# Patient Record
Sex: MALE | Race: WHITE | NOT HISPANIC OR LATINO | URBAN - METROPOLITAN AREA
[De-identification: names, ages, dates, MRNs, and addresses within clinical notes are randomized per-mention and may not be internally consistent; named-entity substitution may affect disease eponyms.]

---

## 2020-07-25 ENCOUNTER — TELEPHONE ENCOUNTER (OUTPATIENT)
Dept: URBAN - METROPOLITAN AREA CLINIC 13 | Facility: CLINIC | Age: 45
End: 2020-07-25

## 2020-07-26 ENCOUNTER — TELEPHONE ENCOUNTER (OUTPATIENT)
Dept: URBAN - METROPOLITAN AREA CLINIC 13 | Facility: CLINIC | Age: 45
End: 2020-07-26

## 2020-07-26 RX ORDER — DIAZEPAM 5 MG/1
TAKE 1 TABLET 3 TIMES DAILY AS NEEDED TABLET ORAL
Refills: 0 | Status: ACTIVE | COMMUNITY

## 2020-07-26 RX ORDER — HYDROCODONE BITARTRATE AND ACETAMINOPHEN 5; 325 MG/1; MG/1
TAKE 1 TABLET EVERY 4 TO 6 HOURS AS NEEDED TABLET ORAL
Refills: 0 | Status: ACTIVE | COMMUNITY

## 2023-05-23 ENCOUNTER — OFFICE VISIT (OUTPATIENT)
Dept: URBAN - METROPOLITAN AREA SURGERY CENTER 25 | Facility: SURGERY CENTER | Age: 48
End: 2023-05-23

## 2023-07-19 ENCOUNTER — OFFICE VISIT (OUTPATIENT)
Dept: URBAN - METROPOLITAN AREA SURGERY CENTER 25 | Facility: SURGERY CENTER | Age: 48
End: 2023-07-19

## 2023-07-19 ENCOUNTER — OUT OF OFFICE VISIT (OUTPATIENT)
Dept: URBAN - METROPOLITAN AREA SURGERY CENTER 25 | Facility: SURGERY CENTER | Age: 48
End: 2023-07-19
Payer: COMMERCIAL

## 2023-07-19 ENCOUNTER — CLAIMS CREATED FROM THE CLAIM WINDOW (OUTPATIENT)
Dept: URBAN - METROPOLITAN AREA CLINIC 4 | Facility: CLINIC | Age: 48
End: 2023-07-19
Payer: COMMERCIAL

## 2023-07-19 DIAGNOSIS — Z12.11 COLON CANCER SCREENING: ICD-10-CM

## 2023-07-19 DIAGNOSIS — Z80.0 FAMILY HISTORY OF CANCER OF DIGESTIVE SYSTEM: ICD-10-CM

## 2023-07-19 DIAGNOSIS — K63.89 OTHER SPECIFIED DISEASES OF INTESTINE: ICD-10-CM

## 2023-07-19 DIAGNOSIS — Z12.11 COLON CANCER SCREENING (HIGH RISK): ICD-10-CM

## 2023-07-19 DIAGNOSIS — K64.0 FIRST DEGREE HEMORRHOIDS: ICD-10-CM

## 2023-07-19 DIAGNOSIS — D12.8 ADENOMATOUS POLYP OF RECTUM: ICD-10-CM

## 2023-07-19 DIAGNOSIS — K57.30 COLON, DIVERTICULOSIS: ICD-10-CM

## 2023-07-19 DIAGNOSIS — K62.1 RECTAL POLYP: ICD-10-CM

## 2023-07-19 PROCEDURE — 00811 ANES LWR INTST NDSC NOS: CPT | Performed by: ANESTHESIOLOGIST ASSISTANT

## 2023-07-19 PROCEDURE — G8907 PT DOC NO EVENTS ON DISCHARG: HCPCS | Performed by: INTERNAL MEDICINE

## 2023-07-19 PROCEDURE — 00811 ANES LWR INTST NDSC NOS: CPT | Performed by: ANESTHESIOLOGY

## 2023-07-19 PROCEDURE — 88305 TISSUE EXAM BY PATHOLOGIST: CPT | Performed by: PATHOLOGY

## 2023-07-19 PROCEDURE — 45385 COLONOSCOPY W/LESION REMOVAL: CPT | Performed by: INTERNAL MEDICINE

## 2023-07-19 RX ORDER — DIAZEPAM 5 MG/1
TAKE 1 TABLET 3 TIMES DAILY AS NEEDED TABLET ORAL
Refills: 0 | Status: ACTIVE | COMMUNITY

## 2023-07-19 RX ORDER — HYDROCODONE BITARTRATE AND ACETAMINOPHEN 5; 325 MG/1; MG/1
TAKE 1 TABLET EVERY 4 TO 6 HOURS AS NEEDED TABLET ORAL
Refills: 0 | Status: ACTIVE | COMMUNITY

## 2025-05-15 ENCOUNTER — OFFICE VISIT (OUTPATIENT)
Dept: URBAN - METROPOLITAN AREA CLINIC 113 | Facility: CLINIC | Age: 50
End: 2025-05-15
Payer: COMMERCIAL

## 2025-05-15 ENCOUNTER — DASHBOARD ENCOUNTERS (OUTPATIENT)
Age: 50
End: 2025-05-15

## 2025-05-15 DIAGNOSIS — R19.4 CHANGE IN BOWEL HABITS: ICD-10-CM

## 2025-05-15 DIAGNOSIS — K52.9 ENTERITIS: ICD-10-CM

## 2025-05-15 DIAGNOSIS — R14.0 ABDOMINAL BLOATING: ICD-10-CM

## 2025-05-15 DIAGNOSIS — R10.31 ABDOMINAL CRAMPING IN RIGHT LOWER QUADRANT: ICD-10-CM

## 2025-05-15 DIAGNOSIS — R10.84 GENERALIZED ABDOMINAL DISCOMFORT: ICD-10-CM

## 2025-05-15 DIAGNOSIS — K21.9 GERD WITHOUT ESOPHAGITIS: ICD-10-CM

## 2025-05-15 PROBLEM — 43364001: Status: ACTIVE | Noted: 2025-05-15

## 2025-05-15 PROBLEM — 285388000: Status: ACTIVE | Noted: 2025-05-15

## 2025-05-15 PROBLEM — 64613007: Status: ACTIVE | Noted: 2025-05-15

## 2025-05-15 PROBLEM — 88111009: Status: ACTIVE | Noted: 2025-05-15

## 2025-05-15 PROBLEM — 116289008: Status: ACTIVE | Noted: 2025-05-15

## 2025-05-15 PROCEDURE — 99214 OFFICE O/P EST MOD 30 MIN: CPT | Performed by: NURSE PRACTITIONER

## 2025-05-15 PROCEDURE — 99204 OFFICE O/P NEW MOD 45 MIN: CPT | Performed by: NURSE PRACTITIONER

## 2025-05-15 RX ORDER — GABAPENTIN 600 MG/1
1 TABLET TABLET, FILM COATED ORAL
Status: ACTIVE | COMMUNITY
Start: 2025-05-15

## 2025-05-15 RX ORDER — PANTOPRAZOLE SODIUM 40 MG/1
1 TABLET TABLET, DELAYED RELEASE ORAL ONCE A DAY
Qty: 30 | Refills: 5 | OUTPATIENT
Start: 2025-05-15

## 2025-05-15 RX ORDER — DICYCLOMINE HYDROCHLORIDE 10 MG/1
1 TABLET CAPSULE ORAL
Qty: 60 | Refills: 3 | OUTPATIENT
Start: 2025-05-15

## 2025-05-15 RX ORDER — DIAZEPAM 5 MG/1
TAKE 1 TABLET 3 TIMES DAILY AS NEEDED TABLET ORAL
Refills: 0 | Status: ON HOLD | COMMUNITY

## 2025-05-15 RX ORDER — CHOLECALCIFEROL (VITAMIN D3) 50 MCG
1 TABLET TABLET ORAL
Status: ACTIVE | COMMUNITY
Start: 2025-05-15

## 2025-05-15 RX ORDER — HYDROCODONE BITARTRATE AND ACETAMINOPHEN 5; 325 MG/1; MG/1
TAKE 1 TABLET EVERY 4 TO 6 HOURS AS NEEDED TABLET ORAL
Refills: 0 | Status: ACTIVE | COMMUNITY

## 2025-05-15 RX ORDER — TIZANIDINE 4 MG/1
1 TABLET TABLET ORAL AS NEEDED
Status: ACTIVE | COMMUNITY
Start: 2025-05-15

## 2025-05-15 NOTE — HPI-OTHER HISTORIES
Labs 1/31/2025 include BMP normal. LFTs normal TB 0.3, ALP 77, ALT 36, AST 17. Lipase 43. Amylase 93. ESR 1. CBC: WBC 5.9, hemoglobin 16.1, MCV 95, platelet 244.  screening colonoscopy  7/19/2023 notable for BBPS 9, removal of 2 sessile 3 to 4 mm rectal polyps, nonbleeding internal hemorrhoids, exam otherwise normal. Pathology reported polyps as hyperplastic. Due to his family history, screening recommended in 2028.

## 2025-05-15 NOTE — HPI-TODAY'S VISIT:
This is a 49-year-old male with a family history of colon cancer due screening in 2028, and a personal history of chronic kidney disease, post Lyme disease syndrome, colon diverticulosis, and hyperplastic colon polyps referred from Dr. Canseco for evaluation of enteritis. He was last seen for a screening colonoscopy due to a family history of colon cancer in a first-degree relative 7/19/2023 notable for BBPS 9, removal of 2 sessile 3 to 4 mm rectal polyps, nonbleeding internal hemorrhoids, exam otherwise normal.  Pathology reported polyps as hyperplastic.  Due to his family history, screening recommended in 2028. Labs 1/31/2025 include BMP normal.  LFTs normal TB 0.3, ALP 77, ALT 36, AST 17.  Lipase 43.  Amylase 93.  ESR 1.  CBC: WBC 5.9, hemoglobin 16.1, MCV 95, platelet 244. He reports a several month history of right-sided abdominal pain.  He may have pain indicated in the right lower anterior/lateral ribs or in the right upper quadrant or in the right lower quadrant.  He reports a fleeting, stabbing sensation and an occasional dull pain in the right side of his abdomen.  He reports tenderness to touch.  He reports some exacerbations associated with movement.  Pain is unassociated with meals or defecation.  He also reports occasional postprandial bloating with an acidic feeling and generalized abdominal discomfort.  When this occurs, he may have frequent soft, small stools.  He has a daily bowel movement.  He admits occasional constipation described as a firm stool.  He "maybe, at times" experiences nausea.  For the last few months, he reports episodes during which he has frequent heartburn and regurgitation.  These episodes may occur for more than a week and then improve.  He denies dysphagia.  He also reports occasional belching that has the odor of sulfur. He has been diagnosed with granulomatous myositis.  He has recently been in a research study with [facility] and had a PET scan.  He reports changes consistent with granulomatous myositis.  He has been under the care of Dr. Bojorquez.  He was on mycophenolate prior to entering into the study.  He anticipates that this will be restarted or that he may be prescribed methotrexate.

## 2025-05-27 ENCOUNTER — TELEPHONE ENCOUNTER (OUTPATIENT)
Dept: URBAN - METROPOLITAN AREA CLINIC 113 | Facility: CLINIC | Age: 50
End: 2025-05-27

## 2025-08-11 ENCOUNTER — OFFICE VISIT (OUTPATIENT)
Dept: URBAN - METROPOLITAN AREA CLINIC 113 | Facility: CLINIC | Age: 50
End: 2025-08-11